# Patient Record
Sex: FEMALE | Race: WHITE
[De-identification: names, ages, dates, MRNs, and addresses within clinical notes are randomized per-mention and may not be internally consistent; named-entity substitution may affect disease eponyms.]

---

## 2018-08-08 ENCOUNTER — HOSPITAL ENCOUNTER (EMERGENCY)
Dept: HOSPITAL 62 - ER | Age: 23
LOS: 1 days | Discharge: HOME | End: 2018-08-09
Payer: COMMERCIAL

## 2018-08-08 DIAGNOSIS — Z88.0: ICD-10-CM

## 2018-08-08 DIAGNOSIS — Z3A.25: ICD-10-CM

## 2018-08-08 DIAGNOSIS — O21.2: Primary | ICD-10-CM

## 2018-08-08 PROCEDURE — 81025 URINE PREGNANCY TEST: CPT

## 2018-08-08 PROCEDURE — 81001 URINALYSIS AUTO W/SCOPE: CPT

## 2018-08-08 PROCEDURE — 99284 EMERGENCY DEPT VISIT MOD MDM: CPT

## 2018-08-08 PROCEDURE — S0119 ONDANSETRON 4 MG: HCPCS

## 2018-08-08 NOTE — ER DOCUMENT REPORT
ED Medical Screen (RME)





- General


Chief Complaint: Vomiting


Stated Complaint: DEHYDRATION


Time Seen by Provider: 08/08/18 23:56


Notes: 





Patient presents with onset of nausea that started last night woke up and had 

approximately 8 bouts of vomiting today but no diarrhea.  No recent travel has 

not been around any sick contacts and has not been out of the country recently.

  Generalized body aches but no specific point pain and denies any chest or 

abdominal pain at this time.  No burning with urination.  Patient states she 

has a history of POTS (positional orthostatic tachycardic syndrome, otherwise 

no known medical problems





I have greeted and performed a rapid initial assessment of this patient.  A 

comprehensive ED assessment and evaluation of the patient, analysis of test 

results and completion of the medical decision making process will be conducted 

by additional ED providers.





PHYSICAL EXAMINATION:





GENERAL: Well-appearing, well-nourished and in no acute distress.





HEAD: Atraumatic, normocephalic.





EYES: Pupils equal round extraocular movements intact,  conjunctiva are normal.





ENT: Nares patent





NECK: Normal range of motion





LUNGS: No respiratory distress





Musculoskeletal: Normal range of motion





NEUROLOGICAL:  Normal speech, normal gait. 





PSYCH: Normal mood, normal affect.





SKIN: Warm, Dry, normal turgor, no rashes or lesions noted.


TRAVEL OUTSIDE OF THE [place] IN LAST 30 DAYS: No





- Related Data


Allergies/Adverse Reactions: 


 





amoxicillin [Amoxicillin] Allergy (Intermediate, Verified 02/15/17 10:27)


 Generalized rash


Penicillins Allergy (Intermediate, Verified 02/15/17 10:27)


 Generalized rash











Physical Exam





- Vital signs


Vitals: 





 











Temp Pulse Resp BP Pulse Ox


 


 98.3 F   108 H  18   92/54 L  97 


 


 08/08/18 23:11  08/08/18 23:11  08/08/18 23:11  08/08/18 23:11  08/08/18 23:11














Course





- Vital Signs


Vital signs: 





 











Temp Pulse Resp BP Pulse Ox


 


 98.3 F   108 H  18   92/54 L  97 


 


 08/08/18 23:11  08/08/18 23:11  08/08/18 23:11  08/08/18 23:11  08/08/18 23:11














Doctor's Discharge





- Discharge


Referrals: 


ED TORRES MD [Primary Care Provider] - Follow up as needed

## 2018-08-09 VITALS — DIASTOLIC BLOOD PRESSURE: 68 MMHG | SYSTOLIC BLOOD PRESSURE: 106 MMHG

## 2018-08-09 LAB
APPEARANCE UR: (no result)
APTT PPP: (no result) S
BILIRUB UR QL STRIP: NEGATIVE
GLUCOSE UR STRIP-MCNC: NEGATIVE MG/DL
KETONES UR STRIP-MCNC: 80 MG/DL
NITRITE UR QL STRIP: NEGATIVE
PH UR STRIP: 5 [PH] (ref 5–9)
PROT UR STRIP-MCNC: NEGATIVE MG/DL
SP GR UR STRIP: 1.03
UROBILINOGEN UR-MCNC: 4 MG/DL (ref ?–2)

## 2018-08-09 NOTE — ER DOCUMENT REPORT
ED General





- General


Chief Complaint: Vomiting


Stated Complaint: DEHYDRATION


Time Seen by Provider: 18 23:56


Mode of Arrival: Ambulatory


Information source: Patient


TRAVEL OUTSIDE OF THE [place] IN LAST 30 DAYS: No





- HPI


Patient complains to provider of: yesterday


Onset: Other - 22 year old female that is 25 weeks pregnant who presents for 

evaluation of nausea and vomiting for one day. She notes 5 or 6 episodes of 

vomiting without any associated abdominal pain, she deniesfevers, she denies 

diarrhea or constipation, chest pain, light headedness or other symptoms in the 

past. She has not had any issues during her pregnancy thus far, she has not 

tried anything to help with the symptoms.





- Related Data


Allergies/Adverse Reactions: 


 





amoxicillin [Amoxicillin] Allergy (Intermediate, Verified 02/15/17 10:27)


 Generalized rash


Penicillins Allergy (Intermediate, Verified 02/15/17 10:27)


 Generalized rash











Past Medical History





- General


Information source: Patient





- Social History


Smoking Status: Unknown if Ever Smoked


Chew tobacco use (# tins/day): No


Frequency of alcohol use: None


Drug Abuse: None


Family History: None


Patient has suicidal ideation: No


Patient has homicidal ideation: No


Renal/ Medical History: Denies: Hx Peritoneal Dialysis





Review of Systems





- Review of Systems


-: Yes All other systems reviewed and negative





Physical Exam





- Vital signs


Vitals: 


 











Temp Pulse Resp BP Pulse Ox


 


 98.3 F   108 H  18   92/54 L  97 


 


 18 23:11  18 23:11  18 23:11  18 23:11  18 23:11














- General


General appearance: Appears well


In distress: None





- HEENT


Head: Normocephalic


Eyes: Normal, Scleral icterus


Neck: Normal





- Respiratory


Respiratory status: No respiratory distress


Chest status: Nontender


Breath sounds: Normal


Chest palpation: Normal





- Cardiovascular


Rhythm: Regular


Heart sounds: Normal auscultation


Murmur: No





- Abdominal


Inspection: Other - obviously gravid abdomen, rounded without tenderness, 

rebound or guarding





- Back


Back: Normal





- Extremities


General upper extremity: Normal inspection


General lower extremity: Normal inspection





- Neurological


Neuro grossly intact: Yes


Cognition: Normal





Course





- Re-evaluation


Re-evalutation: 





18 07:04


this 21 yo  presented at 25 weeks pregnant for evaluation of nausea and 

vomiting. She recieved zofran through triage and appears well overall she is in 

no distress resting comfortably in her room on evaluation.


Her abdominal examination is reassuring as there is no tenderness and she has 

not had any pain.


Her urinalysis does not demonstrate any infection at this time but she is 

somewhat concentrated so believe she needs hydration. 


After drinking 2 cups of water, ambulating and remaining well appearing patient 

remained with benign abdominal examination.


Bed side ultrasound demonstrated a well appearing IUP With appreciable heart 

beat 145 BPM


Being that patient is tolerating PO, feels well and has benign abdomen will 

plan for discharge with OB follow up and a brief script for zofran.





- Vital Signs


Vital signs: 


 











Temp Pulse Resp BP Pulse Ox


 


 97.6 F   72   18   106/68   96 


 


 18 02:24  18 02:24  18 02:24  18 02:27  18 02:24














- Laboratory


Laboratory results interpreted by me: 


 











  18





  00:48


 


Urine Ketones  80 H


 


Urine Blood  MODERATE H


 


Urine Urobilinogen  4.0 H


 


Ur Leukocyte Esterase  TRACE H


 


Urine HCG, Qual  POSITIVE H














Discharge





- Discharge


Clinical Impression: 


Nausea & vomiting


Qualifiers:


 Vomiting type: unspecified Vomiting Intractability: non-intractable Qualified 

Code(s): R11.2 - Nausea with vomiting, unspecified





Pregnancy


Qualifiers:


 Weeks of gestation: 25 weeks Qualified Code(s): Z3A.25 - 25 weeks gestation of 

pregnancy





Condition: Good


Disposition: HOME, SELF-CARE


Instructions:  Antinausea Medication ([facility]), Vomiting ([facility])


Additional Instructions: 


Your seen today in the emergency department for your vomiting, you had an 

evaluation including a physical exam and an ultrasound as well as a urine test.


Use the Zofran prescribed you to treat her nausea, return for any worsening 

vomiting or abdominal pain.


Call your OB doctor tomorrow to follow-up.


Return for any fevers or chills.





Prescriptions: 


Ondansetron [Zofran Odt 4 mg Tablet] 1 - 2 tab PO Q4H PRN #20 tab.rapdis


 PRN Reason: For Nausea/Vomiting


Referrals: 


ED TORRES MD [Primary Care Provider] - Follow up as needed

## 2018-11-12 ENCOUNTER — HOSPITAL ENCOUNTER (OUTPATIENT)
Dept: HOSPITAL 62 - LC | Age: 23
Discharge: HOME | End: 2018-11-12
Attending: OBSTETRICS & GYNECOLOGY
Payer: MEDICAID

## 2018-11-12 DIAGNOSIS — Z3A.38: ICD-10-CM

## 2018-11-12 DIAGNOSIS — O36.5930: Primary | ICD-10-CM

## 2018-11-12 PROCEDURE — 4A1HXCZ MONITORING OF PRODUCTS OF CONCEPTION, CARDIAC RATE, EXTERNAL APPROACH: ICD-10-PCS | Performed by: OBSTETRICS & GYNECOLOGY

## 2018-11-12 PROCEDURE — 59025 FETAL NON-STRESS TEST: CPT

## 2018-11-12 NOTE — NON STRESS TEST REPORT
=================================================================

Non Stress Test

=================================================================

Datetime Report Generated by CPN: 11/12/2018 12:14

   

   

=================================================================

DEMOGRAPHIC

=================================================================

   

EGA NST:  38.2

   

=================================================================

INDICATION

=================================================================

   

Indication for Study:  Intrauterine Growth Restriction; Ordered by

   Provider

   

=================================================================

VITAL SIGNS

=================================================================

   

Temperature - NST:  98.7

Pulse - NST:  84

RESP - NST:  18

NBPSYS NST:  104

NBPDIA NST:  56

   

=================================================================

MONITORING

=================================================================

   

Monitor Explained:  Monitor Explained; Test Explained; Patient

   Verbalized Understanding

Time on Monitor:  11/12/2018 10:45

Time off Monitor:  11/12/2018 11:34

NST Duration:  49

   

=================================================================

NST INTERVENTIONS

=================================================================

   

NST Interventions:  PO Hydration; Reposition Patient

Physician Notified NST:  J BONILLA, CNM REVIEWED STRIP

BABY A:  [id number]

   

=================================================================

BABY A

=================================================================

   

Fetal Movement :  Present

Contraction Frequency :  NONE

FHR Baseline :  135

Accelerations :  15X15

Decelerations :  None

Variability :  Moderate 6-25bpm

NST Review:  Meets Criteria for Reactive NST

NST Review and Verified By :  Ledy Gómez RN

NST Results:  Reactive

   

=================================================================

NST REPORT

=================================================================

   

Report Trigger:  Send Report

## 2018-11-14 ENCOUNTER — HOSPITAL ENCOUNTER (OUTPATIENT)
Dept: HOSPITAL 62 - LC | Age: 23
Discharge: HOME | End: 2018-11-14
Attending: OBSTETRICS & GYNECOLOGY
Payer: MEDICAID

## 2018-11-14 DIAGNOSIS — Z3A.38: ICD-10-CM

## 2018-11-14 DIAGNOSIS — O47.1: ICD-10-CM

## 2018-11-14 DIAGNOSIS — O36.8130: Primary | ICD-10-CM

## 2018-11-14 PROCEDURE — 4A1HXCZ MONITORING OF PRODUCTS OF CONCEPTION, CARDIAC RATE, EXTERNAL APPROACH: ICD-10-PCS | Performed by: OBSTETRICS & GYNECOLOGY

## 2018-11-14 PROCEDURE — 59025 FETAL NON-STRESS TEST: CPT

## 2018-11-14 NOTE — NON STRESS TEST REPORT
=================================================================

Non Stress Test

=================================================================

Datetime Report Generated by CPN: 11/14/2018 15:45

   

   

=================================================================

DEMOGRAPHIC

=================================================================

   

EGA NST:  38.4

   

=================================================================

INDICATION

=================================================================

   

Indication for Study:  Decreased Fetal Movement

   

=================================================================

MONITORING

=================================================================

   

Monitor Explained:  Monitor Explained; Test Explained; Patient

   Verbalized Understanding

Time on Monitor:  11/14/2018 15:08

Time off Monitor:  11/14/2018 15:30

NST Duration:  22

   

=================================================================

NST INTERVENTIONS

=================================================================

   

NST Interventions:  PO Hydration; Reposition Patient

Physician Notified NST:  EUGENE Baig CNM

BABY A:  [id number]

   

=================================================================

BABY A

=================================================================

   

Fetal Movement :  Present

Contraction Frequency :  none

FHR Baseline :  135

Accelerations :  15X15

Decelerations :  None

Variability :  Moderate 6-25bpm

NST Review:  Meets Criteria for Reactive NST

NST Review and Verified By :  EDDIE Grace Results:  Reactive

   

=================================================================

NST REPORT

=================================================================

   

Report Trigger:  Send Report

## 2018-11-20 ENCOUNTER — HOSPITAL ENCOUNTER (INPATIENT)
Dept: HOSPITAL 62 - LR | Age: 23
LOS: 2 days | Discharge: HOME | End: 2018-11-22
Attending: OBSTETRICS & GYNECOLOGY | Admitting: OBSTETRICS & GYNECOLOGY
Payer: MEDICAID

## 2018-11-20 DIAGNOSIS — Z88.0: ICD-10-CM

## 2018-11-20 DIAGNOSIS — Z3A.39: ICD-10-CM

## 2018-11-20 LAB
ADD MANUAL DIFF: NO
APPEARANCE UR: (no result)
APTT PPP: YELLOW S
BARBITURATES UR QL SCN: NEGATIVE
BASOPHILS # BLD AUTO: 0 10^3/UL (ref 0–0.2)
BASOPHILS NFR BLD AUTO: 0.4 % (ref 0–2)
BILIRUB UR QL STRIP: NEGATIVE
EOSINOPHIL # BLD AUTO: 0.1 10^3/UL (ref 0–0.6)
EOSINOPHIL NFR BLD AUTO: 1.6 % (ref 0–6)
ERYTHROCYTE [DISTWIDTH] IN BLOOD BY AUTOMATED COUNT: 13.7 % (ref 11.5–14)
GLUCOSE UR STRIP-MCNC: NEGATIVE MG/DL
HCT VFR BLD CALC: 29.9 % (ref 36–47)
HGB BLD-MCNC: 10.7 G/DL (ref 12–15.5)
KETONES UR STRIP-MCNC: NEGATIVE MG/DL
LYMPHOCYTES # BLD AUTO: 1.9 10^3/UL (ref 0.5–4.7)
LYMPHOCYTES NFR BLD AUTO: 22.2 % (ref 13–45)
MCH RBC QN AUTO: 34 PG (ref 27–33.4)
MCHC RBC AUTO-ENTMCNC: 35.9 G/DL (ref 32–36)
MCV RBC AUTO: 95 FL (ref 80–97)
METHADONE UR QL SCN: NEGATIVE
MONOCYTES # BLD AUTO: 0.4 10^3/UL (ref 0.1–1.4)
MONOCYTES NFR BLD AUTO: 4.8 % (ref 3–13)
NEUTROPHILS # BLD AUTO: 6.1 10^3/UL (ref 1.7–8.2)
NEUTS SEG NFR BLD AUTO: 71 % (ref 42–78)
NITRITE UR QL STRIP: NEGATIVE
PCP UR QL SCN: NEGATIVE
PH UR STRIP: 5 [PH] (ref 5–9)
PLATELET # BLD: 189 10^3/UL (ref 150–450)
PROT UR STRIP-MCNC: 30 MG/DL
RBC # BLD AUTO: 3.16 10^6/UL (ref 3.72–5.28)
SP GR UR STRIP: 1.03
TOTAL CELLS COUNTED % (AUTO): 100 %
URINE AMPHETAMINES SCREEN: NEGATIVE
URINE BENZODIAZEPINES SCREEN: NEGATIVE
URINE COCAINE SCREEN: NEGATIVE
URINE MARIJUANA (THC) SCREEN: NEGATIVE
UROBILINOGEN UR-MCNC: 2 MG/DL (ref ?–2)
WBC # BLD AUTO: 8.6 10^3/UL (ref 4–10.5)

## 2018-11-20 PROCEDURE — 86850 RBC ANTIBODY SCREEN: CPT

## 2018-11-20 PROCEDURE — 0HQ9XZZ REPAIR PERINEUM SKIN, EXTERNAL APPROACH: ICD-10-PCS | Performed by: ADVANCED PRACTICE MIDWIFE

## 2018-11-20 PROCEDURE — 80307 DRUG TEST PRSMV CHEM ANLYZR: CPT

## 2018-11-20 PROCEDURE — 4A1HXCZ MONITORING OF PRODUCTS OF CONCEPTION, CARDIAC RATE, EXTERNAL APPROACH: ICD-10-PCS | Performed by: ADVANCED PRACTICE MIDWIFE

## 2018-11-20 PROCEDURE — 36415 COLL VENOUS BLD VENIPUNCTURE: CPT

## 2018-11-20 PROCEDURE — 94760 N-INVAS EAR/PLS OXIMETRY 1: CPT

## 2018-11-20 PROCEDURE — 85025 COMPLETE CBC W/AUTO DIFF WBC: CPT

## 2018-11-20 PROCEDURE — 86592 SYPHILIS TEST NON-TREP QUAL: CPT

## 2018-11-20 PROCEDURE — 86901 BLOOD TYPING SEROLOGIC RH(D): CPT

## 2018-11-20 PROCEDURE — 81005 URINALYSIS: CPT

## 2018-11-20 PROCEDURE — 86900 BLOOD TYPING SEROLOGIC ABO: CPT

## 2018-11-20 PROCEDURE — 85027 COMPLETE CBC AUTOMATED: CPT

## 2018-11-20 RX ADMIN — FERROUS SULFATE TAB 325 MG (65 MG ELEMENTAL FE) SCH MG: 325 (65 FE) TAB at 17:41

## 2018-11-20 RX ADMIN — DOCUSATE SODIUM SCH MG: 100 CAPSULE, LIQUID FILLED ORAL at 17:41

## 2018-11-20 RX ADMIN — IBUPROFEN SCH: 800 TABLET, FILM COATED ORAL at 15:04

## 2018-11-20 RX ADMIN — SODIUM CHLORIDE, SODIUM LACTATE, POTASSIUM CHLORIDE, AND CALCIUM CHLORIDE PRN MLS/HR: .6; .31; .03; .02 INJECTION, SOLUTION INTRAVENOUS at 07:15

## 2018-11-20 RX ADMIN — SODIUM CHLORIDE, SODIUM LACTATE, POTASSIUM CHLORIDE, AND CALCIUM CHLORIDE PRN MLS/HR: .6; .31; .03; .02 INJECTION, SOLUTION INTRAVENOUS at 10:34

## 2018-11-20 RX ADMIN — IBUPROFEN SCH: 800 TABLET, FILM COATED ORAL at 22:50

## 2018-11-20 RX ADMIN — SODIUM CHLORIDE, SODIUM LACTATE, POTASSIUM CHLORIDE, AND CALCIUM CHLORIDE PRN MLS/HR: .6; .31; .03; .02 INJECTION, SOLUTION INTRAVENOUS at 13:06

## 2018-11-20 NOTE — ADMISSION PHYSICAL
=================================================================



=================================================================

Datetime Report Generated by CPN: 2018 08:41

   

   

=================================================================

CURRENT ADMISSION

=================================================================

   

Chief Complaint:  Scheduled Induction of Labor

Indication for Induction:  Not Applicable

Admit Impression :  Term, Intrauterine Pregnancy; Induction of Labor

Admit Plan:  Initiate Labor Induction Protocol

   

=================================================================

ALLERGIES

=================================================================

   

Medication Allergies:  Yes

Medication Allergies:  Penicillins/MO/Generalized ella (2018);

   amoxicillin/MO/Generalized ella (2018)

Latex:  No Latex Allergies

   

=================================================================

OBSTETRICAL HISTORY

=================================================================

   

EDC:  2018 00:00

:  3

Para:  2

Term:  2

:  0

SAB:  0

IAB:  0

Ectopic:  0

Livin

Cesareans:  0

VBACs:  0

Multiple Births:  0

Gestational Diabetes:  No

Rh Sensitization:  No

Incompetent Cervix:  No

DEVON:  No

Infertility:  No

ART Treatment:  No

Uterine Anomaly:  No

IUGR:  No

Hx Previous C/S:  No

Macrosomia:  No

Hx Loss/Stillborn:  No

PIH:  No

Hx  Death:  No

Placenta Previa/Abruption:  No

Depression/PP Depression:  No

PTL/PROM:  No

Post Partum Hemorrhage:  No

Obstetrical History Comments:  

   7/3/15- 40.3  Male infant- epidural, vaccum assist, nuchal x1

   2/15/17-  Male infant

   

=================================================================

***SEE PRENATAL RECORDS***

=================================================================

   

Alcohol:  No

Marijuana :  No

Cocaine:  No

Other Illicit Drugs:  No

Cigarettes:  Never Smoker. [id number]

   

=================================================================

MEDICAL HISTORY

=================================================================

   

Diabetes:  No

Blood Transfusion:  No

Pulmonary Disease (Asthma, TB):  No

Breast Disease:  No

Hypertension:  No

Gyn Surgery:  No

Heart Disease:  No

Hosp/Surgery:  Yes

Autoimmune Disorder:  No

Anesthetic Complications:  No

Kidney Disease:  No

Abnormal Pap Smear:  No

Neuro/Epilepsy:  No

Psychiatric Disorders:  No

Other Medical Diseases:  No

Hepatitis/Liver Disease:  No

Significant Family History:  No

Varicosities/Phlebitis:  No

Trauma/Violence :  No

Thyroid Dysfunction:  No

   

=================================================================

INFECTIOUS HISTORY

=================================================================

   

Gonorrhea:  No

Genital Herpes:  No

Chlamydia:  No

Tuberculosis:  No

Syphilis:  No

Hepatitis:  No

HIV/AIDS Exposure:  No

Rash or Viral Illness:  No

HPV:  No

   

=================================================================

PHYSICAL EXAM

=================================================================

   

General:  Normal

HEENT:  Normal

Neurologic:  Normal

Thyroid:  Normal

Heart:  Normal

Lungs:  Normal

Breast:  Normal

Back:  Normal

Abdomen:  Normal

Genitourinary Exam:  Normal

Extremities:  Normal

DTRs:  Normal

Pelvic Type:  Adequate

Vital Signs:  Reviewed; Within Normal Limits

   

=================================================================

MEMBRANES

=================================================================

   

Membranes:  Intact

   

=================================================================

FETUS A

=================================================================

   

EGA:  39.3

Monitoring:  External US

FHR- Baseline:  135

Variability:  Moderate 6-25bpm

Accelerations:  15X15

Decelerations:  None

FHR Category:  Category I

Admit Comment:   at 39.3 wks here for IOL. Pt noted to be 3-4 cm

   in the office and added on for induction. Pt doing well this

   morning, plans an epidural when in active labor. GBS Positive.

   Pitocin infusing. Attending MD is Dr Saunders

   

=================================================================

PLANS FOR LABOR AND DELIVERY

=================================================================

   

Labor and Delivery:  None

Pain Management:  Epidural

Feeding Preference:  Breast

Benefit of Breast Feed Discussed:  Yes

Circumcision:  N/A

   

=================================================================

INFORMED CONSENT

=================================================================

   

Assignment:  Roberto Saunders MD

Signature:  Electronically signed by Santa Baig CNM on 2018

   at 08:41  with User ID: [email or web address]

:  Electronically signed by Santa Baig CNM on 2018 at 08:41

   with User ID: [email or web address]

## 2018-11-20 NOTE — ADMISSION PHYSICAL
=================================================================



=================================================================

Datetime Report Generated by CPN: 2018 08:49

   

   

=================================================================

CURRENT ADMISSION

=================================================================

   

Chief Complaint:  Scheduled Induction of Labor

Indication for Induction:  Not Applicable

Admit Impression :  Term, Intrauterine Pregnancy; Induction of Labor

Admit Plan:  Initiate Labor Induction Protocol

   

=================================================================

ALLERGIES

=================================================================

   

Medication Allergies:  Yes

Medication Allergies:  Penicillins/MO/Generalized ella (2018);

   amoxicillin/MO/Generalized ella (2018)

Latex:  No Latex Allergies

   

=================================================================

OBSTETRICAL HISTORY

=================================================================

   

EDC:  2018 00:00

:  3

Para:  2

Term:  2

:  0

SAB:  0

IAB:  0

Ectopic:  0

Livin

Cesareans:  0

VBACs:  0

Multiple Births:  0

Gestational Diabetes:  No

Rh Sensitization:  No

Incompetent Cervix:  No

DEVON:  No

Infertility:  No

ART Treatment:  No

Uterine Anomaly:  No

IUGR:  No

Hx Previous C/S:  No

Macrosomia:  No

Hx Loss/Stillborn:  No

PIH:  No

Hx  Death:  No

Placenta Previa/Abruption:  No

Depression/PP Depression:  No

PTL/PROM:  No

Post Partum Hemorrhage:  No

Obstetrical History Comments:  

   7/3/15- 40.3  Male infant- epidural, vaccum assist, nuchal x1

   2/15/17-  Male infant

   

=================================================================

***SEE PRENATAL RECORDS***

=================================================================

   

Alcohol:  No

Marijuana :  No

Cocaine:  No

Other Illicit Drugs:  No

Cigarettes:  Never Smoker. [id number]

   

=================================================================

MEDICAL HISTORY

=================================================================

   

Diabetes:  No

Blood Transfusion:  No

Pulmonary Disease (Asthma, TB):  No

Breast Disease:  No

Hypertension:  No

Gyn Surgery:  No

Heart Disease:  No

Hosp/Surgery:  Yes

Autoimmune Disorder:  No

Anesthetic Complications:  No

Kidney Disease:  No

Abnormal Pap Smear:  No

Neuro/Epilepsy:  No

Psychiatric Disorders:  No

Other Medical Diseases:  No

Hepatitis/Liver Disease:  No

Significant Family History:  No

Varicosities/Phlebitis:  No

Trauma/Violence :  No

Thyroid Dysfunction:  No

   

=================================================================

INFECTIOUS HISTORY

=================================================================

   

Gonorrhea:  No

Genital Herpes:  No

Chlamydia:  No

Tuberculosis:  No

Syphilis:  No

Hepatitis:  No

HIV/AIDS Exposure:  No

Rash or Viral Illness:  No

HPV:  No

   

=================================================================

PHYSICAL EXAM

=================================================================

   

General:  Normal

HEENT:  Normal

Neurologic:  Normal

Thyroid:  Normal

Heart:  Normal

Lungs:  Normal

Breast:  Normal

Back:  Normal

Abdomen:  Normal

Genitourinary Exam:  Normal

Extremities:  Normal

DTRs:  Normal

Pelvic Type:  Adequate

Vital Signs:  Reviewed; Within Normal Limits

   

=================================================================

MEMBRANES

=================================================================

   

Membranes:  Intact

   

=================================================================

FETUS A

=================================================================

   

EGA:  39.3

Monitoring:  External US

FHR- Baseline:  135

Variability:  Moderate 6-25bpm

Accelerations:  15X15

Decelerations:  None

FHR Category:  Category I

Admit Comment:  Correction, GBS negative status

   

=================================================================

PLANS FOR LABOR AND DELIVERY

=================================================================

   

Labor and Delivery:  None

Pain Management:  Epidural

Feeding Preference:  Breast

Benefit of Breast Feed Discussed:  Yes

Circumcision:  N/A

   

=================================================================

INFORMED CONSENT

=================================================================

   

Assignment:  Roberto Saunders MD

Signature:  Electronically signed by Santa Baig CNM on 2018

   at 08:48  with User ID: [email or web address]

:  Electronically signed by Santa Baig CNM on 2018 at 08:48

   with User ID: [email or web address]

## 2018-11-20 NOTE — L&D PROGRESS NOTES
=================================================================

PROGRESS NOTES

=================================================================

Datetime Report Generated by CPN: 2018 11:46

   

   

=================================================================

PROGRESS NOTE

=================================================================

   

Impression:  Normal Progression of Labor; Reassuring Fetal Heart Rate

Procedures:  Artificial ROM; Sterile Vag Exam

Plan:  Continue Present Management; Induction; Anticipate Vaginal

   Delivery

Vital Signs :  Reviewed; Within Normal Limits

Comment:  Epidural in place and pt is comfortable. Pitocin infusing.

   AROM attempted w/scant return of blood tinged fluid. Position

   changes encouraged. Anticipate . Dr Saunders is the attending MD

   

=================================================================

VAGINAL EXAM

=================================================================

   

Dilatation:  6

Effacement:  90

Station:  -1

Contractions:  1-2 min

   

=================================================================

LAST VAGINAL EXAM-NURSING

=================================================================

   

Dilitation:  6.0

Dilitation:  4.5

Dilitation:  4.0

Effacement:  90

Effacement:  60

Effacement:  60

Station:  -1

Station:  -2

Station:  -2

Contractions:  pt not feeling any contractions

   

=================================================================

MEMBRANES

=================================================================

   

Membranes:  Ruptured

Membranes:  Intact

Amniotic Fluid Color:  Bloody

   

=================================================================

FETUS A

=================================================================

   

FHR - Baseline:  130

Variability:  Moderate 6-25bpm

Accelerations:  15X15

Decelerations:  Variable

FHR Comments:  occassional variable

   

=================================================================

SIGNATURE

=================================================================

   

SIGNATURE:  [id number];[id number];[id number]

SIGNATURE:  [id number];[id number]

SIGNATURE:  [id number];[id number]

SIGNATURE:  [id number]

SIGNATURE:  [id number]

Assignment:  Roberto Saunders MD

Signature:  Electronically signed by Santa Baig CNM on 2018

   at 11:45  with User ID: [email or web address]

:  Electronically signed by Santa Baig CNM on 2018 at 11:45

   with User ID: [email or web address]

## 2018-11-20 NOTE — DELIVERY SUMMARY
=================================================================

Del Sum A-C

=================================================================

Datetime Report Generated by CPN: 2018 15:22

   

   

=================================================================

DELIVERY PERSONNEL

=================================================================

   

DELIVERY PERSONNEL:  [id number]

Delivery Doctor::  Santa Baig CNM

Nurse Midwife Certified::  Santa Baig CNM

Labor and Delivery Nurse::  Kelly Wooten RN

Labor and Delivery Nurse::  SUHA Anderson

Student Observers::  Tammie Davies RN

Scrub Tech/CNMIREYA:  Danyell Baron CNA II

Additional Personnel: :  Ledy Gómez RN

   

=================================================================

MATERNAL INFORMATION

=================================================================

   

Delivery Anesthesia:  Epidural

Medications After Delivery:  Pitocin Bolus-Please Comment

Meds After Delivery Comment:  Pitocin 20mu in 1000ml LR

Maternal Complications:  None; Abnormal Cord Length

Provider Comments:   of viable female, crying and in stable

   condition. Delivered EDU and placed on pts abdoman. Umbilical cord

   wrapped around baby's feet.  Cord clamped and cut after one minute.

   Cord blood obtained. Placenta S/C/I,  IV Pitocin and 200 mcg SL

   Cytotec given.   ml.  Repair of first degree laceration done.

   FF w/ decreased lochia, mother and baby in stable condition. She

   plans to breastfeed. Attending MD is Dr Saunders.

   

=================================================================

LABOR SUMMARY

=================================================================

   

EDC:  2018 00:00

No. Babies in Womb:  1

 Attempted:  No

Labor Anesthesia:  Epidural

   

=================================================================

LABOR INFORMATION

=================================================================

   

Reason for Induction:  Other

Reason for Induction- Other:  elective

Onset of Labor:  2018 11:39

Complete Dilatation:  2018 13:16

Oxytocin:  Induction

Group B Beta Strep:  neg

Antibiotics # of Doses:  na

Antibiotics Time of Last Dose:  na

Name of Antibiotic Given:  na

Steroids Given:  None

Reason Steroids Not Administered:  Not Applicable

   

=================================================================

MEMBRANES

=================================================================

   

Membranes Rupture Method:  Artificial

Rupture of Membranes:  2018 11:40

Length of Rupture (hr):  1.72

Amniotic Fluid Color:  Bloody

Amniotic Fluid Amount:  Scant

Amniotic Fluid Odor:  Normal

   

=================================================================

STAGES OF LABOR

=================================================================

   

Stage 1 hr:  1

Stage 1 min:  37

Stage 2 hr:  0

Stage 2 min:  7

Stage 3 hr:  0

Stage 3 min:  4

Total Time in Labor hr:  1

Total Time in Labor min:  48

   

=================================================================

VAGINAL DELIVERY

=================================================================

   

Episiotomy:  None

Laceration #1:  Perineal

Laceration Extension #1:  First Degree

Laceration Repair:  Yes

Laceration Repair Note:  1st degree laceration made hemostatic by

   placing a figure 8 stitch using 3.0 Vicryl. Pt tolerated repair well

Sponge Count Correct:  N/A

Sharps Count Correct:  N/A

   

=================================================================

CSECTION DELIVERY

=================================================================

   

Primary Indication:  N/A

Secondary Indication:  N/A

CSection Incidence:  N/A

Labor:  N/A

Elective:  N/A

CSection Incision:  N/A

   

=================================================================

BABY A INFORMATION

=================================================================

   

Infant Delivery Date/Time:  2018 13:23

Method of Delivery:  Vaginal

Born in Route :  No

:  N/A

Forceps:  N/A

Vacuum Extraction:  N/A

Shoulder Dystocia :  Yes

   

=================================================================

PRESENTATION/POSITION BABY A

=================================================================

   

Presentation:  Cephalic

Cephalic Presentation:  Vertex

Vertex Position:  Left Occipital Transverse

Breech Presentation:  N/A

   

=================================================================

PLACENTA INFORMATION BABY A

=================================================================

   

Placenta Delivery Time :  2018 13:27

Placenta Method of Delivery:  Spontaneous

Placenta Status:  Delivered

   

=================================================================

APGAR SCORES BABY A

=================================================================

   

Heart Rate 1 min:  >100 bpm

Resp Effort 1 min:  Good Cry

Reflex Irritability 1 min:  Cough or Sneeze or Pulls Away

Muscle Tone 1 min:  Active Motion

Color 1 min:  Blue/Pale

Resuscitation Effort 1 min:  Tactile Stimulation

APGAR SCORE 1 MIN:  8

Heart Rate 5 min:  >100 bpm

Resp Effort 5 min:  Good Cry

Reflex Irritability 5 min:  Cough or Sneeze or Pulls Away

Muscle Tone 5 min:  Active Motion

Color 5 min:  Body Pink, Extremities Blue

Resuscitation Effort 5 min:  N/A; Tactile Stimulation

APGAR SCORE 5 MIN:  9

Resuscitation Effort 10 min:  N/A

   

=================================================================

INFANT INFORMATION BABY A

=================================================================

   

Gestational Age at Delivery:  39.3

Gestational Status:  Full Term- 39- 40.6 Weeks

Infant Outcome :  Liveborn

Infant Condition :  Stable

Infant Sex:  Female

   

=================================================================

IDENTIFICATION BABY A

=================================================================

   

Infant Verification Date/Time:  2018 13:56

ID Band Number:  [id number]

Mother's Name Verified:  Yes

Infant Medical Record Number:  [id number]

RN Verifying Infant:  Arline Camp RNC

Additional Verifying Personnel:  Kelly Wooten RNC

   

=================================================================

WEIGHT/LENGTH BABY A

=================================================================

   

Infant Birthweight (gm):  2700

Infant Weight (lb):  5

Infant Weight (oz):  15

Infant Length (in):  19.25

Infant Length (cm):  48.90

   

=================================================================

CORD INFORMATION BABY A

=================================================================

   

No. Cord Vessels:  3

Nuchal Cord :  N/A

Cord Blood Taken:  Yes-For Eval (Mom's Blood Type - or O+)

Infant Suction:  Mouth; Nose

   

=================================================================

ASSESSMENT BABY A

=================================================================

   

Infant Complications:  None

Physical Findings at Delivery:  Within Normal Limits

Infant Respirations:  Appears Normal

Skin to Skin:  Yes

Neonatologist/ALS Called :  No

   

=================================================================

BABY B INFORMATION

=================================================================

   

 :  N/A

   

=================================================================

SIGNATURES

=================================================================

   

Assignment:  Roberto Saunders MD

Signature:  Electronically signed by Santa Baig CNM on 2018

   at 13:54  with User ID: [email or web address]

:  Electronically signed by Santa Baig CNM on 2018 at 13:54

   with User ID: [email or web address]

## 2018-11-20 NOTE — WARNING SIGNS IN BABIES
=================================================================

VOD Warning Signs

=================================================================

Datetime Report Generated by N: 11/20/2018 12:20

   

VOD#608 -Warning Signs in Babies:  Viewed with Parent(s)/Family   

   (11/12/2018 10:37:Kelly Wooten)

## 2018-11-21 LAB
ERYTHROCYTE [DISTWIDTH] IN BLOOD BY AUTOMATED COUNT: 13.9 % (ref 11.5–14)
HCT VFR BLD CALC: 28.6 % (ref 36–47)
HGB BLD-MCNC: 10.3 G/DL (ref 12–15.5)
MCH RBC QN AUTO: 34.3 PG (ref 27–33.4)
MCHC RBC AUTO-ENTMCNC: 35.9 G/DL (ref 32–36)
MCV RBC AUTO: 96 FL (ref 80–97)
PLATELET # BLD: 168 10^3/UL (ref 150–450)
RBC # BLD AUTO: 2.99 10^6/UL (ref 3.72–5.28)
WBC # BLD AUTO: 11 10^3/UL (ref 4–10.5)

## 2018-11-21 RX ADMIN — FERROUS SULFATE TAB 325 MG (65 MG ELEMENTAL FE) SCH MG: 325 (65 FE) TAB at 09:05

## 2018-11-21 RX ADMIN — SENNOSIDES, DOCUSATE SODIUM SCH EACH: 50; 8.6 TABLET, FILM COATED ORAL at 09:05

## 2018-11-21 RX ADMIN — IBUPROFEN SCH MG: 800 TABLET, FILM COATED ORAL at 21:09

## 2018-11-21 RX ADMIN — Medication SCH CAP: at 09:05

## 2018-11-21 RX ADMIN — DOCUSATE SODIUM SCH MG: 100 CAPSULE, LIQUID FILLED ORAL at 09:05

## 2018-11-21 RX ADMIN — DOCUSATE SODIUM SCH MG: 100 CAPSULE, LIQUID FILLED ORAL at 17:04

## 2018-11-21 RX ADMIN — IBUPROFEN SCH MG: 800 TABLET, FILM COATED ORAL at 13:01

## 2018-11-21 RX ADMIN — IBUPROFEN SCH MG: 800 TABLET, FILM COATED ORAL at 00:03

## 2018-11-21 RX ADMIN — IBUPROFEN SCH MG: 800 TABLET, FILM COATED ORAL at 08:01

## 2018-11-21 RX ADMIN — FERROUS SULFATE TAB 325 MG (65 MG ELEMENTAL FE) SCH MG: 325 (65 FE) TAB at 17:04

## 2018-11-21 NOTE — PDOC PROGRESS REPORT
Subjective-OB


Progress Note for:: 11/21/18


Subjective: 


Pt doing well, no concerns. She reports light bleeding, regular diet and no 

difficulty voiding.








Physical Exam (OB)


Vital Signs: 


 











Temp Pulse Resp BP Pulse Ox


 


 97.8 F   55 L  16   105/55 L  97 


 


 11/21/18 07:59  11/21/18 07:59  11/21/18 07:59  11/21/18 07:59  11/21/18 07:59








 Intake & Output











 11/20/18 11/21/18 11/22/18





 06:59 06:59 06:59


 


Intake Total  3332 480


 


Balance  3332 480


 


Weight 66.2 kg  














- Abdomen


Description: Soft, Round


Hernia Present: No


Fundal Description: Firm, Midline


Fundal Height: u/u - u/2





Objective-Diagnostic


Laboratory: 


 





 11/21/18 06:50 





 











  11/21/18





  06:50


 


WBC  11.0 H


 


RBC  2.99 L


 


Hgb  10.3 L


 


Hct  28.6 L


 


MCV  96


 


MCH  34.3 H


 


MCHC  35.9


 


RDW  13.9


 


Plt Count  168














Assessment and Plan(PN)





- Assessment and Plan


(1) Vaginal delivery


Is this a current diagnosis for this admission?: Yes   





- Time Spent with Patient


Time with patient: Less than 15 minutes


Medications reviewed and adjusted accordingly: Yes





- Disposition


Anticipated Discharge: Home


Within: within 24 hours

## 2018-11-22 VITALS — DIASTOLIC BLOOD PRESSURE: 72 MMHG | SYSTOLIC BLOOD PRESSURE: 108 MMHG

## 2018-11-22 RX ADMIN — DOCUSATE SODIUM SCH MG: 100 CAPSULE, LIQUID FILLED ORAL at 09:00

## 2018-11-22 RX ADMIN — SENNOSIDES, DOCUSATE SODIUM SCH EACH: 50; 8.6 TABLET, FILM COATED ORAL at 09:00

## 2018-11-22 RX ADMIN — IBUPROFEN SCH MG: 800 TABLET, FILM COATED ORAL at 05:14

## 2018-11-22 RX ADMIN — FERROUS SULFATE TAB 325 MG (65 MG ELEMENTAL FE) SCH MG: 325 (65 FE) TAB at 09:00

## 2018-11-22 RX ADMIN — Medication SCH CAP: at 09:00

## 2018-11-22 NOTE — PDOC DISCHARGE SUMMARY
Final Diagnosis


Discharge Date: 11/22/18





- Final Diagnosis


(1) Obstetrical laceration


Is this a current diagnosis for this admission?: Yes   





(2) Vaginal delivery


Is this a current diagnosis for this admission?: Yes   





Discharge Data





- Discharge Medication


Prescriptions: 


Ibuprofen [Motrin 800 mg Tablet] 800 mg PO Q8HP PRN #60 tablet


 PRN Reason: 


Home Medications: 








Prenatal Comb No.42/Folic Acid [Prena1 Chew Tablet] 1 tab PO DAILY 05/04/15 


Ibuprofen [Motrin 800 mg Tablet] 800 mg PO Q8HP PRN #60 tablet 11/22/18 








Prenatal Procedures: NST


Intrapartum Procedure(s): Spontaneous Vaginal Delivery


Laceration-Degree: 1st





- Diagnosis Test


Laboratory: 


 











Temp Pulse Resp BP Pulse Ox


 


 97.8 F   66   16   108/72   95 


 


 11/22/18 08:05  11/22/18 08:05  11/22/18 08:05  11/22/18 08:05  11/22/18 08:05








 











  11/20/18 11/20/18 11/21/18





  06:49 07:38 06:50


 


RBC  3.16 L   2.99 L


 


Hgb  10.7 L   10.3 L


 


Hct  29.9 L   28.6 L


 


Urine Opiates Screen   NEGATIVE 














- Discharge information/Instructions


Discharge Activity: Balance Activity w/Rest, Pelvic Rest


Discharge Diet: Regular


Disposition: HOME, SELF-CARE


Follow up with: [facility]


in: 4, Weeks

## 2019-01-26 ENCOUNTER — HOSPITAL ENCOUNTER (EMERGENCY)
Dept: HOSPITAL 62 - ER | Age: 24
Discharge: HOME | End: 2019-01-26
Payer: MEDICAID

## 2019-01-26 VITALS — DIASTOLIC BLOOD PRESSURE: 64 MMHG | SYSTOLIC BLOOD PRESSURE: 101 MMHG

## 2019-01-26 DIAGNOSIS — R00.0: ICD-10-CM

## 2019-01-26 DIAGNOSIS — N12: Primary | ICD-10-CM

## 2019-01-26 DIAGNOSIS — N93.9: ICD-10-CM

## 2019-01-26 DIAGNOSIS — R11.0: ICD-10-CM

## 2019-01-26 DIAGNOSIS — Z88.0: ICD-10-CM

## 2019-01-26 DIAGNOSIS — R50.9: ICD-10-CM

## 2019-01-26 LAB
ADD MANUAL DIFF: NO
ALBUMIN SERPL-MCNC: 4.2 G/DL (ref 3.5–5)
ALP SERPL-CCNC: 210 U/L (ref 38–126)
ALT SERPL-CCNC: 64 U/L (ref 9–52)
ANION GAP SERPL CALC-SCNC: 9 MMOL/L (ref 5–19)
APPEARANCE UR: (no result)
APTT PPP: (no result) S
AST SERPL-CCNC: 42 U/L (ref 14–36)
BASOPHILS # BLD AUTO: 0 10^3/UL (ref 0–0.2)
BASOPHILS NFR BLD AUTO: 0.3 % (ref 0–2)
BILIRUB DIRECT SERPL-MCNC: 0.7 MG/DL (ref 0–0.4)
BILIRUB SERPL-MCNC: 1.2 MG/DL (ref 0.2–1.3)
BILIRUB UR QL STRIP: NEGATIVE
BUN SERPL-MCNC: 11 MG/DL (ref 7–20)
CALCIUM: 9 MG/DL (ref 8.4–10.2)
CHLORIDE SERPL-SCNC: 103 MMOL/L (ref 98–107)
CO2 SERPL-SCNC: 27 MMOL/L (ref 22–30)
EOSINOPHIL # BLD AUTO: 0 10^3/UL (ref 0–0.6)
EOSINOPHIL NFR BLD AUTO: 0.1 % (ref 0–6)
ERYTHROCYTE [DISTWIDTH] IN BLOOD BY AUTOMATED COUNT: 12.3 % (ref 11.5–14)
GLUCOSE SERPL-MCNC: 111 MG/DL (ref 75–110)
GLUCOSE UR STRIP-MCNC: NEGATIVE MG/DL
HCT VFR BLD CALC: 34.8 % (ref 36–47)
HGB BLD-MCNC: 11.9 G/DL (ref 12–15.5)
KETONES UR STRIP-MCNC: NEGATIVE MG/DL
LYMPHOCYTES # BLD AUTO: 1 10^3/UL (ref 0.5–4.7)
LYMPHOCYTES NFR BLD AUTO: 8.1 % (ref 13–45)
MCH RBC QN AUTO: 31.7 PG (ref 27–33.4)
MCHC RBC AUTO-ENTMCNC: 34.2 G/DL (ref 32–36)
MCV RBC AUTO: 93 FL (ref 80–97)
MONOCYTES # BLD AUTO: 0.8 10^3/UL (ref 0.1–1.4)
MONOCYTES NFR BLD AUTO: 6.2 % (ref 3–13)
NEUTROPHILS # BLD AUTO: 11.1 10^3/UL (ref 1.7–8.2)
NEUTS SEG NFR BLD AUTO: 85.3 % (ref 42–78)
NITRITE UR QL STRIP: POSITIVE
PH UR STRIP: 5 [PH] (ref 5–9)
PLATELET # BLD: 221 10^3/UL (ref 150–450)
POTASSIUM SERPL-SCNC: 3.2 MMOL/L (ref 3.6–5)
PROT SERPL-MCNC: 6.9 G/DL (ref 6.3–8.2)
PROT UR STRIP-MCNC: >=500 MG/DL
RBC # BLD AUTO: 3.74 10^6/UL (ref 3.72–5.28)
SODIUM SERPL-SCNC: 138.8 MMOL/L (ref 137–145)
SP GR UR STRIP: 1.01
TOTAL CELLS COUNTED % (AUTO): 100 %
UROBILINOGEN UR-MCNC: 4 MG/DL (ref ?–2)
WBC # BLD AUTO: 13 10^3/UL (ref 4–10.5)

## 2019-01-26 PROCEDURE — 87088 URINE BACTERIA CULTURE: CPT

## 2019-01-26 PROCEDURE — 99283 EMERGENCY DEPT VISIT LOW MDM: CPT

## 2019-01-26 PROCEDURE — 96365 THER/PROPH/DIAG IV INF INIT: CPT

## 2019-01-26 PROCEDURE — 96361 HYDRATE IV INFUSION ADD-ON: CPT

## 2019-01-26 PROCEDURE — 36415 COLL VENOUS BLD VENIPUNCTURE: CPT

## 2019-01-26 PROCEDURE — 96375 TX/PRO/DX INJ NEW DRUG ADDON: CPT

## 2019-01-26 PROCEDURE — 87086 URINE CULTURE/COLONY COUNT: CPT

## 2019-01-26 PROCEDURE — 80053 COMPREHEN METABOLIC PANEL: CPT

## 2019-01-26 PROCEDURE — 87186 SC STD MICRODIL/AGAR DIL: CPT

## 2019-01-26 PROCEDURE — 85025 COMPLETE CBC W/AUTO DIFF WBC: CPT

## 2019-01-26 PROCEDURE — 81001 URINALYSIS AUTO W/SCOPE: CPT

## 2019-01-26 PROCEDURE — 84703 CHORIONIC GONADOTROPIN ASSAY: CPT

## 2019-01-26 PROCEDURE — 87040 BLOOD CULTURE FOR BACTERIA: CPT

## 2019-01-26 NOTE — ER DOCUMENT REPORT
ED Medical Screen (RME)





- General


Chief Complaint: Pain With Urination


Stated Complaint: CHILLS, FEVER, URINARY ISSUE


Time Seen by Provider: 01/26/19 18:49


Primary Care Provider: 


DIMITRY CLIFTON MD [Primary Care Provider] - Follow up as needed


Notes: 





23-year-old female patient with right flank pain, pressure urination, fever and 

chills.  She states her temperature went to 106.  She has been taking Azo-

Standard and Tylenol.








I have greeted and performed a rapid initial assessment of this patient.  A 

comprehensive ED assessment and evaluation of the patient, analysis of test 

results and completion of the medical decision making process will be conducted 

by additional ED providers.


TRAVEL OUTSIDE OF THE [place] IN LAST 30 DAYS: No





- Related Data


Allergies/Adverse Reactions: 


                                        





amoxicillin [Amoxicillin] Allergy (Intermediate, Verified 11/20/18 06:32)


   Generalized rash


Penicillins Allergy (Intermediate, Verified 11/20/18 06:32)


   Generalized rash











Past Medical History





- Social History


Chew tobacco use (# tins/day): No


Frequency of alcohol use: None


Renal/ Medical History: Denies: Hx Peritoneal Dialysis





Physical Exam





- Vital signs


Vitals: 





                                        











Temp Pulse Resp BP Pulse Ox


 


 99.7 F   132 H  13   109/71   100 


 


 01/26/19 18:05  01/26/19 18:05  01/26/19 18:05  01/26/19 18:05  01/26/19 18:05














Course





- Vital Signs


Vital signs: 





                                        











Temp Pulse Resp BP Pulse Ox


 


 99.7 F   132 H  13   109/71   100 


 


 01/26/19 18:05  01/26/19 18:05  01/26/19 18:05  01/26/19 18:05  01/26/19 18:05














Doctor's Discharge





- Discharge


Referrals: 


DIMITRY CLIFTON MD [Primary Care Provider] - Follow up as needed

## 2019-01-26 NOTE — ER DOCUMENT REPORT
ED GI/





- General


Chief Complaint: Pain With Urination


Stated Complaint: CHILLS, FEVER, URINARY ISSUE


Time Seen by Provider: 01/26/19 18:49


Primary Care Provider: 


DIMITRY CLIFTON MD [Primary Care Provider] - 01/28/19


Notes: 


Patient is a 23 year old female that comes to the emergency department for chief

complaint of right flank pain, "pressure" with urination, nausea, and 

fever/chills for the past 3-4 days. Symptoms gradually worsening. She denies 

abdominal pain, vomiting, vaginal discharge. Reports slight vaginal bleeding. 

She denies any surgeries, daily medications, history of kidney stones, or any 

other past medical history.





TRAVEL OUTSIDE OF THE [place] IN LAST 30 DAYS: No





- Related Data


Allergies/Adverse Reactions: 


                                        





amoxicillin [Amoxicillin] Allergy (Intermediate, Verified 11/20/18 06:32)


   Generalized rash


Penicillins Allergy (Intermediate, Verified 11/20/18 06:32)


   Generalized rash











Past Medical History





- General


Information source: Patient





- Social History


Smoking Status: Never Smoker


Chew tobacco use (# tins/day): No


Frequency of alcohol use: None


Lives with: Family


Family History: None


Patient has suicidal ideation: No


Patient has homicidal ideation: No





- Medical History


Medical History: Negative


Renal/ Medical History: Denies: Hx Peritoneal Dialysis


Surgical Hx: Negative





- Immunizations


Immunizations up to date: Yes


Hx Diphtheria, Pertussis, Tetanus Vaccination: Yes





Review of Systems





- Review of Systems


Constitutional: No symptoms reported


EENT: No symptoms reported


Cardiovascular: No symptoms reported


Respiratory: No symptoms reported


Gastrointestinal: See HPI


Genitourinary: See HPI


Female Genitourinary: See HPI


Musculoskeletal: No symptoms reported


Skin: No symptoms reported


Hematologic/Lymphatic: No symptoms reported


Neurological/Psychological: No symptoms reported





Physical Exam





- Vital signs


Vitals: 


                                        











Temp Pulse Resp BP Pulse Ox


 


 99.7 F   132 H  13   109/71   100 


 


 01/26/19 18:05  01/26/19 18:05  01/26/19 18:05  01/26/19 18:05  01/26/19 18:05














- Notes


Notes: 





GENERAL: Alert.  Appears uncomfortable, lying on her side, slightly disheveled


HEAD: Normocephalic, atraumatic.


EYES: Pupils equal, round, and reactive to light. Extraocular movements intact.


ENT: Oral mucosa dry with cracked lips, tongue midline. Oropharynx unremarkable.

Airway patent. Nares patent, no nasal septal hematoma, TM's intact.


NECK: Full range of motion. Supple. Trachea midline.


LUNGS: Clear to auscultation bilaterally, no wheezes, rales, or rhonchi. No 

respiratory distress.


HEART: Tachycardia, normal rhythm, no murmur


ABDOMEN: There is mild generalized tenderness in the lower abdomen, upper 

abdomen benign, no rigidity, guarding, or distention.


GENITOURINARY: Deferred


EXTREMITIES: Moves all 4 extremities spontaneously. No edema, normal radial and 

dorsalis pedis pulses bilaterally. No cyanosis.


BACK: no cervical, thoracic, lumbar midline tenderness. No saddle anesthesia, 

normal distal neurovascular exam.  Mild right-sided CVA tenderness, left 

unremarkable.


NEUROLOGICAL: Alert and oriented x3. Normal speech. [cranial nerves II through 

XII grossly intact]. 


PSYCH: Normal affect, normal mood.


SKIN: Warm, dry, normal turgor. No rashes or lesions noted.





Course





- Re-evaluation


Re-evalutation: 


Patient mildly ill-appearing but not toxic in appearance.  She is 

conversational.  After Toradol, Zofran, IV fluids, she is sitting up, states she

feels much better.  Fever did come back, she had to be treated with Tylenol.  

After this an additional fluids tachycardia resolved.





CBC shows leukocytosis at 13,000 with elevation of neutrophils but no bandemia. 

Chemistry nonspecific, urinalysis shows positive nitrites, large amount of white

blood cells, many white blood cell clumps.  Patient with dysuria worsening for 

3-4 days gradually with development of fever and nausea.  She does have right 

CVA tenderness which is mild.  Abdomen is benign.  Based on progression of 

symptoms I have low suspicion of infected ureterolithiasis.  Workup and 

evaluation is consistent with pyelonephritis.  Blood cultures and urine cultures

pending.  Given Rocephin initially.





Reevaluated patient at bedside.  She states she feels good and she wants to go 

home.  Patient will be discharged on antibiotics but she was given strict return

precautions, cultures are pending, patient states she will follow-up with 

primary care and return if she worsens, details were discussed.  Stable at time 

of discharge.





- Vital Signs


Vital signs: 


                                        











Temp Pulse Resp BP Pulse Ox


 


 98.1 F   132 H  14   101/64   98 


 


 01/26/19 21:57  01/26/19 18:05  01/26/19 21:56  01/26/19 21:57  01/26/19 21:56














- Laboratory


Result Diagrams: 


                                 01/26/19 19:00





                                 01/26/19 19:00


Laboratory results interpreted by me: 


                                        











  01/26/19 01/26/19 01/26/19





  19:00 19:00 19:00


 


WBC  13.0 H  


 


Hgb  11.9 L  


 


Hct  34.8 L  


 


Seg Neutrophils %  85.3 H  


 


Lymphocytes %  8.1 L  


 


Absolute Neutrophils  11.1 H  


 


Potassium   3.2 L 


 


Glucose   111 H 


 


Direct Bilirubin   0.7 H 


 


AST   42 H 


 


ALT   64 H 


 


Alkaline Phosphatase   210 H 


 


Urine Protein    >=500 H


 


Urine Blood    LARGE H


 


Urine Nitrite    POSITIVE H


 


Urine Urobilinogen    4.0 H


 


Ur Leukocyte Esterase    LARGE H














Discharge





- Discharge


Clinical Impression: 


 Pyelonephritis





Fever


Qualifiers:


 Fever type: unspecified Qualified Code(s): R50.9 - Fever, unspecified





Condition: Stable


Disposition: HOME, SELF-CARE


Additional Instructions: 


Your workup indicates a kidney infection.  You have begun antibiotics for this, 

complete antibiotic therapy at home as prescribed.


You have been provided with some pain medication, do not combine with alcohol or

drive while taking.  Do not combine with other sedating medication.


Take nausea medication as prescribed.  Stay hydrated.


Follow-up closely with primary care.  





Return if you worsen including increased pain, vomiting, spiking fevers, or any 

other concerning or worsening symptoms.


Prescriptions: 


Cephalexin Monohydrate [Keflex 500 mg Capsule] 500 mg PO QID #28 capsule


Promethazine HCl [Phenergan 25 mg Tablet] 25 mg PO Q6H PRN #20 tablet


 PRN Reason: 


Referrals: 


DIMITRY CLIFTON MD [Primary Care Provider] - 01/28/19

## 2019-11-09 ENCOUNTER — HOSPITAL ENCOUNTER (EMERGENCY)
Dept: HOSPITAL 62 - ER | Age: 24
Discharge: HOME | End: 2019-11-09
Payer: COMMERCIAL

## 2019-11-09 VITALS — SYSTOLIC BLOOD PRESSURE: 100 MMHG | DIASTOLIC BLOOD PRESSURE: 58 MMHG

## 2019-11-09 DIAGNOSIS — Z88.0: ICD-10-CM

## 2019-11-09 DIAGNOSIS — N93.8: Primary | ICD-10-CM

## 2019-11-09 DIAGNOSIS — R42: ICD-10-CM

## 2019-11-09 LAB
ADD MANUAL DIFF: NO
ANION GAP SERPL CALC-SCNC: 10 MMOL/L (ref 5–19)
APPEARANCE UR: (no result)
APTT PPP: YELLOW S
BASOPHILS # BLD AUTO: 0.1 10^3/UL (ref 0–0.2)
BASOPHILS NFR BLD AUTO: 0.6 % (ref 0–2)
BILIRUB UR QL STRIP: NEGATIVE
BUN SERPL-MCNC: 15 MG/DL (ref 7–20)
CALCIUM: 9.5 MG/DL (ref 8.4–10.2)
CHLORIDE SERPL-SCNC: 106 MMOL/L (ref 98–107)
CO2 SERPL-SCNC: 27 MMOL/L (ref 22–30)
EOSINOPHIL # BLD AUTO: 1.4 10^3/UL (ref 0–0.6)
EOSINOPHIL NFR BLD AUTO: 13.1 % (ref 0–6)
ERYTHROCYTE [DISTWIDTH] IN BLOOD BY AUTOMATED COUNT: 13.3 % (ref 11.5–14)
GLUCOSE SERPL-MCNC: 105 MG/DL (ref 75–110)
GLUCOSE UR STRIP-MCNC: NEGATIVE MG/DL
HCT VFR BLD CALC: 36.4 % (ref 36–47)
HGB BLD-MCNC: 12.5 G/DL (ref 12–15.5)
KETONES UR STRIP-MCNC: NEGATIVE MG/DL
LYMPHOCYTES # BLD AUTO: 2.7 10^3/UL (ref 0.5–4.7)
LYMPHOCYTES NFR BLD AUTO: 24.7 % (ref 13–45)
MCH RBC QN AUTO: 29.8 PG (ref 27–33.4)
MCHC RBC AUTO-ENTMCNC: 34.5 G/DL (ref 32–36)
MCV RBC AUTO: 86 FL (ref 80–97)
MONOCYTES # BLD AUTO: 0.4 10^3/UL (ref 0.1–1.4)
MONOCYTES NFR BLD AUTO: 3.7 % (ref 3–13)
NEUTROPHILS # BLD AUTO: 6.4 10^3/UL (ref 1.7–8.2)
NEUTS SEG NFR BLD AUTO: 57.9 % (ref 42–78)
PH UR STRIP: 7 [PH] (ref 5–9)
PLATELET # BLD: 289 10^3/UL (ref 150–450)
POTASSIUM SERPL-SCNC: 4.1 MMOL/L (ref 3.6–5)
PROT UR STRIP-MCNC: NEGATIVE MG/DL
RBC # BLD AUTO: 4.21 10^6/UL (ref 3.72–5.28)
SP GR UR STRIP: 1.02
TOTAL CELLS COUNTED % (AUTO): 100 %
UROBILINOGEN UR-MCNC: 4 MG/DL (ref ?–2)
WBC # BLD AUTO: 11 10^3/UL (ref 4–10.5)

## 2019-11-09 PROCEDURE — 80048 BASIC METABOLIC PNL TOTAL CA: CPT

## 2019-11-09 PROCEDURE — 81001 URINALYSIS AUTO W/SCOPE: CPT

## 2019-11-09 PROCEDURE — 36415 COLL VENOUS BLD VENIPUNCTURE: CPT

## 2019-11-09 PROCEDURE — 84703 CHORIONIC GONADOTROPIN ASSAY: CPT

## 2019-11-09 PROCEDURE — 87086 URINE CULTURE/COLONY COUNT: CPT

## 2019-11-09 PROCEDURE — 85025 COMPLETE CBC W/AUTO DIFF WBC: CPT

## 2019-11-09 NOTE — ER DOCUMENT REPORT
ED Medical Screen (RME)





- General


Chief Complaint: Vaginal Bleeding


Stated Complaint: VAGINAL BLEEDING


Time Seen by Provider: 11/09/19 15:37


Primary Care Provider: 


DIMITRY CLIFTON MD [Primary Care Provider] - Follow up as needed


Mode of Arrival: Ambulatory


Information source: Patient


Notes: 





Patient states that she had been on the birth control patch for 6 months and 

then took the patch off 3 days ago.  Patient states she is been having vaginal 

bleeding for the past month.  Patient does report some dizziness.  Patient 

complains of lower pelvic cramping.





I have greeted and performed a rapid initial assessment of this patient.  A 

comprehensive ED assessment and evaluation of the patient, analysis of test 

results and completion of the medical decision making process will be conducted 

by additional ED providers.


TRAVEL OUTSIDE OF THE [place] IN LAST 30 DAYS: No





- Related Data


Allergies/Adverse Reactions: 


                                        





amoxicillin [Amoxicillin] Allergy (Intermediate, Verified 11/20/18 06:32)


   Generalized rash


Penicillins Allergy (Intermediate, Verified 11/20/18 06:32)


   Generalized rash











Past Medical History


Renal/ Medical History: Denies: Hx Peritoneal Dialysis





- Immunizations


Immunizations up to date: Yes


Hx Diphtheria, Pertussis, Tetanus Vaccination: Yes





Physical Exam





- Vital signs


Vitals: 





                                        











Temp Pulse Resp BP Pulse Ox


 


 97.9 F   92   16   125/88 H  98 


 


 11/09/19 15:35  11/09/19 15:35  11/09/19 15:35  11/09/19 15:35  11/09/19 15:35














- General


General appearance: Appears well, Alert


In distress: None





- Abdominal


Tenderness: Tender - Lower pelvic





Course





- Vital Signs


Vital signs: 





                                        











Temp Pulse Resp BP Pulse Ox


 


 97.9 F   92   16   125/88 H  98 


 


 11/09/19 15:35  11/09/19 15:35  11/09/19 15:35  11/09/19 15:35  11/09/19 15:35














Doctor's Discharge





- Discharge


Referrals: 


DIMITRY CLIFTON MD [Primary Care Provider] - Follow up as needed

## 2020-07-18 ENCOUNTER — HOSPITAL ENCOUNTER (EMERGENCY)
Dept: HOSPITAL 62 - ER | Age: 25
Discharge: HOME | End: 2020-07-18
Payer: COMMERCIAL

## 2020-07-18 VITALS — SYSTOLIC BLOOD PRESSURE: 110 MMHG | DIASTOLIC BLOOD PRESSURE: 69 MMHG

## 2020-07-18 DIAGNOSIS — L03.90: Primary | ICD-10-CM

## 2020-07-18 DIAGNOSIS — M10.9: ICD-10-CM

## 2020-07-18 PROCEDURE — 99283 EMERGENCY DEPT VISIT LOW MDM: CPT

## 2020-07-18 NOTE — RADIOLOGY REPORT (SQ)
EXAM DESCRIPTION:  FOOT RIGHT COMPLETE



IMAGES COMPLETED DATE/TIME:  7/18/2020 1:32 pm



REASON FOR STUDY:  pain/swelling



COMPARISON:  None.



NUMBER OF VIEWS:  Three views.



TECHNIQUE:  AP, lateral and oblique  radiographic images acquired of the right foot.



LIMITATIONS:  None.



FINDINGS:  MINERALIZATION: Normal.

BONES: No acute fracture or dislocation.  No worrisome bone lesions.

SOFT TISSUES: Mild soft tissue swelling at the dorsum of the foot.  No radiopaque foreign body.



IMPRESSION:  Mild soft tissue swelling at the dorsum of the right foot.  No radiographic evidence for
 acute fracture.



TECHNICAL DOCUMENTATION:  JOB ID:  [id number]

[place]-64

 2011 [employer]- All Rights Reserved



Reading location - IP/workstation name: [email or web address]

## 2020-07-18 NOTE — ER DOCUMENT REPORT
Doctor's Note


Notes: 





07/18/20 15:34


Patient seen in conjunction with the nurse practitioner, please see her note to 

correlate with mine.  In short this is a 24-year-old female who developed 

redness, swelling, and pain in her right ankle.  It was atraumatic.  It 

developed over the last 24 hours.  She has no systemic symptoms.  She denies any

injury.  On exam she has diffuse edema and swelling over her ankle, which 

travels down into the dorsum of her foot.  She has no significant bony 

tenderness.  She has significant tenderness to light touch.  There is calor 

associated.  She exhibits poor hygiene, with extensive dirt on the sole of her 

feet.  She has some superficial scratches noted over the medial aspect of the 

ankle.  X-rays are unremarkable with the exception of some soft tissue swelling.

 Differential on this is wide at this time.  Certainly this is a young female 

for primary gout, but given her presentation we will cover her with strong anti-

inflammatories.  We will also cover her for cellulitis.  She is to follow-up 

closely, she is given a list of symptoms which should prompt her make immediate 

return, including but not limited to increased redness, pain, swelling, fevers, 

vomiting.

## 2020-07-18 NOTE — ER DOCUMENT REPORT
HPI





- HPI


Time Seen by Provider: 07/18/20 13:08


Notes: 





24-year-old female patient presents with redness and swelling to her right foot 

and ankle.  She states this started yesterday.  She denies any injury to the 

area.  She said it is painful to the touch.  She denies any fever or chills.








- ROS


Systems Reviewed and Negative: Yes All other systems reviewed and negative





- CONSTITUTIONAL


Constitutional: DENIES: Fever





- REPRODUCTIVE


Reproductive: DENIES: Pregnant:





- MUSCULOSKELETAL


Musculoskeletal: REPORTS: Extremity pain, Swelling





Past Medical History





- General


Information source: Patient





- Social History


Smoking Status: Never Smoker


Frequency of alcohol use: None


Drug Abuse: None


Family History: None





- Medical History


Medical History: Negative


Renal/ Medical History: Denies: Hx Peritoneal Dialysis


Surgical Hx: Negative





- Immunizations


Immunizations up to date: Yes


Hx Diphtheria, Pertussis, Tetanus Vaccination: Yes





Vertical Provider Document





- CONSTITUTIONAL


Notes: 





PHYSICAL EXAMINATION:





GENERAL: Well-appearing, well-nourished and in no acute distress.





HEAD: Atraumatic, normocephalic.





EYES: Pupils equal round extraocular movements intact,  conjunctiva are normal.





ENT: Nares patent





NECK: Normal range of motion





LUNGS: No respiratory distress





Musculoskeletal: Erythema and swelling noted to right foot and ankle.  Strong 

dorsalis pedis pulse palpated.  Cap refill less than 3 seconds.  Tender to 

slight touch.  No obvious deformity noted.





NEUROLOGICAL:  Normal speech, normal gait. 





PSYCH: Normal mood, normal affect.





SKIN: Warm, Dry, normal turgor, no rashes or lesions noted.





- INFECTION CONTROL


TRAVEL OUTSIDE OF THE [place] IN LAST 30 DAYS: No





Course





- Re-evaluation


Re-evalutation: 





X-rays were negative.  Discussed case with attending physician.  Differentials 

include cellulitis versus gout.  Patient does have a few areas of like they may 

be small insect bites, this could have led to some cellulitis.  Will start 

patient on Indocin as well as antibiotics.  Patient will return to the emergency

department with any worsening symptoms that she has no primary care follow-up.  

Patient verbalized understanding and agreement with plan.








- Vital Signs


Vital signs: 


                                        











Temp Pulse Resp BP Pulse Ox


 


 98.5 F   79   16   101/72   100 


 


 07/18/20 13:02  07/18/20 13:02  07/18/20 13:02  07/18/20 13:02  07/18/20 13:02














Discharge





- Discharge


Clinical Impression: 


Cellulitis


Qualifiers:


 Site of cellulitis: unspecified site Qualified Code(s): L03.90 - Cellulitis, 

unspecified





Gout


Qualifiers:


 Gout site: ankle Gout etiology: unspecified cause Chronicity: acute Laterality:

right Qualified Code(s): M10.9 - Gout, unspecified





Condition: Stable


Disposition: HOME, SELF-CARE


Additional Instructions: 


The x-rays were negative for any fracture or dislocation.  There is soft tissue 

swelling in the ankle and foot.  This may be an early cellulitis or gout 

flareup.  We will treat you for both.  If you worsen in any way, the redness 

spreads, you start vomiting, your pain significantly increases please return to 

the emergency department at once


Prescriptions: 


Clindamycin HCl [Cleocin 150 mg Capsule] 300 mg PO TID #42 capsule


Indomethacin [Indocin 50 Mg Capsule] 50 mg PO TID #12 capsule


Referrals: 


CORA JEROME PA-C [PHYSICIAN ASSISTANT] - Follow up as needed

## 2020-07-18 NOTE — RADIOLOGY REPORT (SQ)
EXAM DESCRIPTION:  ANKLE RIGHT COMPLETE



IMAGES COMPLETED DATE/TIME:  7/18/2020 1:32 pm



REASON FOR STUDY:  pain/swelling



COMPARISON:  None.



NUMBER OF VIEWS:  Three views.



TECHNIQUE:  AP, lateral, and oblique radiographic images acquired of the right ankle.



LIMITATIONS:  None.



FINDINGS:  MINERALIZATION: Normal.

BONES: No acute fracture or dislocation.  No worrisome bone lesions.

SOFT TISSUES: Mild diffuse soft tissue swelling. No radiopaque foreign body.



IMPRESSION:  Mild diffuse soft tissue swelling at the right ankle.  No radiographic evidence for acut
e fracture.



TECHNICAL DOCUMENTATION:  JOB ID:  [id number]

[id number]

 2011 [employer]- All Rights Reserved



Reading location - IP/workstation name: [email or web address]

## 2020-12-03 ENCOUNTER — HOSPITAL ENCOUNTER (EMERGENCY)
Dept: HOSPITAL 62 - ER | Age: 25
LOS: 1 days | Discharge: HOME | End: 2020-12-04
Payer: SELF-PAY

## 2020-12-03 VITALS — DIASTOLIC BLOOD PRESSURE: 79 MMHG | SYSTOLIC BLOOD PRESSURE: 117 MMHG

## 2020-12-03 DIAGNOSIS — L50.9: Primary | ICD-10-CM

## 2020-12-03 PROCEDURE — 99283 EMERGENCY DEPT VISIT LOW MDM: CPT

## 2020-12-03 NOTE — ER DOCUMENT REPORT
HPI





- HPI


Patient complains to provider of: rash


Time Seen by Provider: 12/03/20 23:11


Pain Level: 0


Notes: 





25 year old female to the ED with C/O a rash to bilateral legs that has gotten 

worse over the night.  She states that she has been getting hives nearly daily 

for the past six months.  Usually she takes benadryl and they improve.   

Tonight, she took two benadryl and it did not improve so she decided to come to 

the ER.  She states that the rash if very itchy.   States that she has not had 

chest pain, SOB, sensation of throat closing tonight, but in the past she will 

have those symptoms.  SHe states in her childhood, she had hives like this 

often.   States that her physician could never figure out why she was getting 

the hives.  She even when to an Allergist and they could not find a good reason.

  States she has no allergies to food.  States she has not changed any lotions, 

soaps, detergent, or any other household goods.  





- ROS


Systems Reviewed and Negative: Yes All other systems reviewed and negative





- CONSTITUTIONAL


Constitutional: DENIES: Fever, Chills





- EENT


EENT: DENIES: Sore Throat, Ear Pain, Congestion





- NEURO


Neurology: DENIES: Headache, Weakness, Vision blurred, Dizzinesss / Vertigo





- CARDIOVASCULAR


Cardiovascular: DENIES: Chest pain





- RESPIRATORY


Respiratory: DENIES: Trouble Breathing, Coughing





- GASTROINTESTINAL


Gastrointestinal: DENIES: Abdominal Pain, Nausea, Patient vomiting, Diarrhea





- URINARY


Urinary: DENIES: Dysuria





- REPRODUCTIVE


LMP: 11/23


Reproductive: DENIES: Pregnant:





- MUSCULOSKELETAL


Musculoskeletal: DENIES: Extremity pain, Back Pain, Neck Pain, Swelling





- DERM


Skin Color: Normal


Skin Problems: Rash - see HPI





Past Medical History





- General


Information source: Patient





- Social History


Smoking Status: Never Smoker


Chew tobacco use (# tins/day): No


Frequency of alcohol use: None


Drug Abuse: None


Family History: None


Renal/ Medical History: Denies: Hx Peritoneal Dialysis





- Immunizations


Immunizations up to date: Yes


Hx Diphtheria, Pertussis, Tetanus Vaccination: Yes





Vertical Provider Document





- CONSTITUTIONAL


Agree With Documented VS: Yes


Exam Limitations: No Limitations


General Appearance: WD/WN, No Apparent Distress





- INFECTION CONTROL


TRAVEL OUTSIDE OF THE [place] IN LAST 30 DAYS: No





- HEENT


HEENT: Atraumatic, Normocephalic, PERRLA


Notes: 





airway is grossly patent.  There is no lip swelling, tongue swelling, uvular 

swelling.  no voice change.  handling oral secretions with ease.   no neck 

swelling. 





- NECK


Neck: Normal Inspection, Supple





- RESPIRATORY


Respiratory: Breath Sounds Normal, No Respiratory Distress.  negative: Rales, 

Rhonchi, Wheezing





- CARDIOVASCULAR


Cardiovascular: Regular Rate, Regular Rhythm, No Murmur





- GI/ABDOMEN


Gastrointestinal: Abdomen Soft, Abdomen Non-Tender, No Organomegaly





- BACK


Back: Normal Inspection





- MUSCULOSKELETAL/EXTREMETIES


Musculoskeletal/Extremeties: MAEW, FROM, Non-Tender





- NEURO


Level of Consciousness: Awake, Alert, Appropriate





- DERM


Integumentary: Warm, Rash - there is a rash to bilateral legs.  IT is 

erythematous and has wheals, most indicative of urticaria.  there are no 

vesicles, open wounds, superimposed infection, areas of necrosis, or skin 

sloughing.   No evidence for erythema multiform.  non tender to palpation.  

Noted excoriations.





Course





- Re-evaluation


Re-evalutation: 





IMpression:  URticaria.  Gave the patient prednisone and pepcid and her 

urticaria are improving and going away.  Will have her follow with dermatology. 

 Will send home with steroid taper, pepcid, hydroxyzine, and an epi pen.  

Educated on when and how to use Epipen and instructed that if she used it, she 

should present to the ER immediately after giving it to herself.  She agrees 

with the plan.  








- Vital Signs


Vital signs: 


                                        











Temp Pulse Resp BP Pulse Ox


 


 97.6 F   68   20   117/79   100 


 


 12/03/20 22:53  12/03/20 22:53  12/03/20 22:53  12/03/20 22:53  12/03/20 22:53














Discharge





- Discharge


Clinical Impression: 


 Hives





Condition: Stable


Disposition: HOME, SELF-CARE


Instructions:  Acute Urticaria ([facility])


Additional Instructions: 


Take medicines as prescribed.  Please follow-up with a dermatologist for further

evaluation for your frequent hives.  Should you have worsening symptoms such as 

significant shortness of breath chest pain or feeling lightheaded please use the

EpiPen prescribed to you.  Return if you have worsening symptoms.


Prescriptions: 


Prednisone [Deltasone 10 mg Tablet] 10 mg PO ASDIR PRN #21 tablet


 PRN Reason: 


Epinephrine [Epipen] 0.3 mg IJ PRN PRN #1 auto.injct


 PRN Reason: 


Hydroxyzine Pamoate 25 mg PO Q8 #20 capsule


Famotidine [Pepcid 20 mg Tablet] 20 mg PO DAILY #12 tablet


Referrals: 


KRYSTAL ROBB NP [Primary Care Provider] - Follow up in 1 week


BELEM CARUSO DO [ACTIVE STAFF] - Follow up in 1 week (for dermatology follow 

up)